# Patient Record
Sex: FEMALE | Race: BLACK OR AFRICAN AMERICAN
[De-identification: names, ages, dates, MRNs, and addresses within clinical notes are randomized per-mention and may not be internally consistent; named-entity substitution may affect disease eponyms.]

---

## 2017-06-11 ENCOUNTER — HOSPITAL ENCOUNTER (EMERGENCY)
Dept: HOSPITAL 62 - ER | Age: 47
Discharge: HOME | End: 2017-06-11
Payer: OTHER GOVERNMENT

## 2017-06-11 VITALS — SYSTOLIC BLOOD PRESSURE: 118 MMHG | DIASTOLIC BLOOD PRESSURE: 80 MMHG

## 2017-06-11 DIAGNOSIS — J03.90: Primary | ICD-10-CM

## 2017-06-11 DIAGNOSIS — R05: ICD-10-CM

## 2017-06-11 PROCEDURE — 99282 EMERGENCY DEPT VISIT SF MDM: CPT

## 2017-06-11 PROCEDURE — 96372 THER/PROPH/DIAG INJ SC/IM: CPT

## 2017-06-11 NOTE — ER DOCUMENT REPORT
HPI





- HPI


Patient complains to provider of: Sore throat


Onset: Yesterday


Onset/Duration: Gradual


Quality of pain: Achy


Pain Level: 4


Context: 


Complains of sore throat, body aches that started yesterday.  Patient does 

complain of some congestion and occasional cough.


Associated Symptoms: Sore throat


Exacerbated by: Denies


Relieved by: Denies


Similar symptoms previously: Yes


Recently seen / treated by doctor: No





- ROS


ROS below otherwise negative: Yes


Systems Reviewed and Negative: Yes All other systems reviewed and negative





- EENT


EENT: REPORTS: Sore Throat, Congestion





- CARDIOVASCULAR


Cardiovascular: DENIES: Chest pain





- RESPIRATORY


Respiratory: REPORTS: Coughing.  DENIES: Trouble Breathing





- GASTROINTESTINAL


Gastrointestinal: DENIES: Nausea, Patient vomiting





- REPRODUCTIVE


Reproductive: DENIES: Pregnant:





- DERM


Skin Color: Normal


Skin Problems: None





Past Medical History





- General


Information source: Patient





- Social History


Smoking Status: Never Smoker


Frequency of alcohol use: None


Drug Abuse: None


Occupation: mechandising


Family History: None


Patient has suicidal ideation: No


Patient has homicidal ideation: No





- Medical History


Medical History: Negative


Renal/ Medical History: Denies: Hx Peritoneal Dialysis


Past Surgical History: Reports: Hx  Section - x2





- Immunizations


Hx Diphtheria, Pertussis, Tetanus Vaccination: Yes - 





Vertical Provider Document





- CONSTITUTIONAL


Agree With Documented VS: Yes


Exam Limitations: No Limitations


General Appearance: WD/WN, No Apparent Distress





- INFECTION CONTROL


TRAVEL OUTSIDE OF THE [place] IN LAST 30 DAYS: No





- HEENT


HEENT: Atraumatic, Normocephalic, Pharyngeal Tenderness, Pharyngeal Erythema.  

negative: Pharyngeal Exudate, Tympanic Membrane Red, Tympanic Membrane Bulging





- NECK


Neck: Lymphadenopathy-Left, Lymphadenopathy-Right





- RESPIRATORY


Respiratory: Breath Sounds Normal, No Respiratory Distress, Chest Non-Tender


O2 Sat by Pulse Oximetry: 98





- CARDIOVASCULAR


Cardiovascular: Regular Rate, Regular Rhythm, No Murmur





- BACK


Back: Normal Inspection





- MUSCULOSKELETAL/EXTREMETIES


Musculoskeletal/Extremeties: MAEW





- NEURO


Level of Consciousness: Awake, Alert, Appropriate


Motor/Sensory: No Motor Deficit





- DERM


Integumentary: Warm, Dry, No Rash





Course





- Vital Signs


Vital signs: 


 











Temp Pulse Resp BP Pulse Ox


 


 98.1 F   96   18   118/80   98 


 


 17 05:50  17 05:50  17 05:50  17 05:50  17 05:50














Discharge





- Discharge


Clinical Impression: 


 Tonsillitis





Condition: Stable


Disposition: HOME, SELF-CARE


Instructions:  Tonsillitis ([facility]), Sore Throat ([facility]), Use of Over-The-Counter 

Ibuprofen ([facility]), Oral Narcotic Medication ([facility]), Antibiotic Shot ([facility])


Additional Instructions: 


Return immediately for any new or worsening symptoms





Followup with your primary care provider, call tomorrow to make a followup 

appointment








Forms:  Return to School


Referrals: 


SAVANNAH WISE MD [Primary Care Provider] - Follow up as needed

## 2017-06-15 ENCOUNTER — HOSPITAL ENCOUNTER (EMERGENCY)
Dept: HOSPITAL 62 - ER | Age: 47
Discharge: HOME | End: 2017-06-15
Payer: OTHER GOVERNMENT

## 2017-06-15 VITALS — DIASTOLIC BLOOD PRESSURE: 78 MMHG | SYSTOLIC BLOOD PRESSURE: 109 MMHG

## 2017-06-15 DIAGNOSIS — J02.9: ICD-10-CM

## 2017-06-15 DIAGNOSIS — B97.89: ICD-10-CM

## 2017-06-15 DIAGNOSIS — R05: ICD-10-CM

## 2017-06-15 DIAGNOSIS — J06.9: Primary | ICD-10-CM

## 2017-06-15 PROCEDURE — 99283 EMERGENCY DEPT VISIT LOW MDM: CPT

## 2017-06-15 NOTE — ER DOCUMENT REPORT
HPI





- HPI


Patient complains to provider of: sore throat, coughing


Onset: Other - last friday


Onset/Duration: Gradual, Persistent


Pain Level: 4


Context: 


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 

persistant sore throat sice friday. seen at [facility]  and persists wtih sore 

throat, cough, mucous wants to make sure its not anything worse. 


Associated Symptoms: None


Exacerbated by: Denies


Relieved by: Denies


Similar symptoms previously: No


Recently seen / treated by doctor: Yes





- ROS


ROS below otherwise negative: Yes


Systems Reviewed and Negative: Yes All other systems reviewed and negative





- REPRODUCTIVE


Reproductive: DENIES: Pregnant:





- DERM


Skin Color: Normal





Past Medical History





- General


Information source: Patient





- Social History


Smoking Status: Never Smoker


Frequency of alcohol use: None


Drug Abuse: None


Lives with: Family


Family History: None


Patient has suicidal ideation: No


Patient has homicidal ideation: No





- Medical History


Medical History: Negative


Renal/ Medical History: Denies: Hx Peritoneal Dialysis


Past Surgical History: Reports: Hx  Section - x2





- Immunizations


Hx Diphtheria, Pertussis, Tetanus Vaccination: Yes - 





Vertical Provider Document





- CONSTITUTIONAL


Agree With Documented VS: Yes


Exam Limitations: No Limitations





- INFECTION CONTROL


TRAVEL OUTSIDE OF THE [place] IN LAST 30 DAYS: No





- HEENT


HEENT: Normocephalic, Pharyngeal Erythema - mild.  negative: Tympanic Membrane 

Red





- NECK


Neck: Supple.  negative: Lymphadenopathy-Left, Lymphadenopathy-Right





- RESPIRATORY


Respiratory: Breath Sounds Normal, No Respiratory Distress


O2 Sat by Pulse Oximetry: 98





- CARDIOVASCULAR


Cardiovascular: Regular Rate, Regular Rhythm





- MUSCULOSKELETAL/EXTREMETIES


Musculoskeletal/Extremeties: MAEW, FROM





- NEURO


Level of Consciousness: Awake, Alert, Appropriate





- DERM


Integumentary: Warm, Dry, No Rash





Course





- Vital Signs


Vital signs: 


 











Temp Pulse Resp BP Pulse Ox


 


 98.8 F   82   16   117/78   98 


 


 06/15/17 06:39  06/15/17 06:39  06/15/17 06:39  06/15/17 06:39  06/15/17 06:39














Discharge





- Discharge


Clinical Impression: 


 Upper respiratory infection, viral





Condition: Good


Disposition: HOME, SELF-CARE


Instructions:  Sore Throat ([facility]), Upper Respiratory Illness ([facility]), Acetaminophen

, Use of Over-The-Counter Ibuprofen ([facility])


Additional Instructions: 


Rest


cool mist humidifier at night, wash daily


plenty of fluids


to  er if worse





Please complete the patient satisfaction survey if you get one, and return it.. 

If you do not receive a survey,  then you can go to the [facility] website, [email or web address] 

and place your comments about your very good care. Thank you very much. It was 

a pleasure being your medical provider today.


Forms:  Return to Work


Referrals: 


SAVANNAH WISE MD [Primary Care Provider] - Follow up as needed

## 2018-05-07 ENCOUNTER — HOSPITAL ENCOUNTER (OUTPATIENT)
Dept: HOSPITAL 62 - WI | Age: 48
End: 2018-05-07
Attending: INTERNAL MEDICINE
Payer: COMMERCIAL

## 2018-05-07 DIAGNOSIS — Z12.31: Primary | ICD-10-CM

## 2018-05-07 PROCEDURE — 77067 SCR MAMMO BI INCL CAD: CPT

## 2018-05-09 NOTE — WOMENS IMAGING REPORT
EXAM DESCRIPTION:  BILAT SCREENING MAMMO W/CAD



COMPLETED DATE/TIME:  5/7/2018 9:08 am



REASON FOR STUDY:  SCREENING MAMMO Z12.31  ENCNTR SCREEN MAMMOGRAM FOR MALIGNANT NEOPLASM OF MAXIMO



COMPARISON:  [id number]



TECHNIQUE:  Standard craniocaudal and mediolateral oblique views of each breast recorded using digita
l acquisition.



LIMITATIONS:  None.



FINDINGS:  No masses, calcifications or architectural distortion. No areas of suspicion.

Read with the assistance of CAD.

.[facility] - R2 Cenova Version 1.3

.[facility] - R2 Cenova Version 1.3

.[facility] - R2 Cenova Version 2.4

.[facility] [place] R2 Cenova Version 2.4

.[facility] - R2  Version 9.2



IMPRESSION:  NORMAL MAMMOGRAM.  BIRADS 1.



BREAST DENSITY:  c. The breasts are heterogeneously dense, which may obscure small masses.



BIRAD:  1 NEGATIVE



RECOMMENDATION:  ROUTINE SCREENING



COMMENT:  The patient has been notified of the results by letter per SA requirements. Additional no
tification policies are in place for contacting patient with suspicious or incomplete findings.

Quality ID #225: The American College of Radiology recommends an annual screening mammogram for women
 aged 40 years or over. This facility utilizes a reminder system to ensure that all patients receive 
reminder letters, and/or direct phone calls for appointments. This includes reminders for routine scr
eening mammograms, diagnostic mammograms, or other Breast Imaging Interventions when appropriate.  Th
is patient will be placed in the appropriate reminder system.

The American College of Radiology (ACR) has developed recommendations for screening MRI of the breast
s in certain patient populations, to be used in conjunction with mammography.  Breast MRI surveillanc
e may be appropriate for women with more than 20% lifetime risk of developing breast cancer  as deter
mined by genetic testing, significant family history of the disease, or history of mantle radiation f
or Hodgkins Disease.  ACR Practice Guidelines 2008.



TECHNICAL DOCUMENTATION:  FINDING NUMBER: (1)

ASSESSMENT: (1)

JOB ID:  [id number]

 2011 [employer]- All Rights Reserved



Reading location - IP/workstation name: [facility]

## 2019-06-09 ENCOUNTER — HOSPITAL ENCOUNTER (EMERGENCY)
Dept: HOSPITAL 62 - ER | Age: 49
Discharge: HOME | End: 2019-06-09
Payer: COMMERCIAL

## 2019-06-09 VITALS — SYSTOLIC BLOOD PRESSURE: 113 MMHG | DIASTOLIC BLOOD PRESSURE: 71 MMHG

## 2019-06-09 DIAGNOSIS — Z91.040: ICD-10-CM
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
## 2019-06-09 DIAGNOSIS — S86.112A: Primary | ICD-10-CM

## 2019-06-09 PROCEDURE — 99283 EMERGENCY DEPT VISIT LOW MDM: CPT

## 2019-06-09 NOTE — ER DOCUMENT REPORT
Addendum entered and electronically signed by REYES HERNANDEZ PA-C  19 

15:38: 








Discharge





- Discharge


Clinical Impression: 


Strain of calf muscle


Qualifiers:


 Encounter type: initial encounter Laterality: left Qualified Code(s): S86.812A 

- Strain of other muscle(s) and tendon(s) at lower leg level, left leg, initial 

encounter





Condition: Good


Disposition: HOME, SELF-CARE


Instructions:  Muscle Strain ([facility])


Additional Instructions: 


No direct weightbearing.  Use crutches for this.  Ice the area 20 minutes/h.  

Naproxen sodium 500 mg every 12 hours as needed for inflammation.  Norco every 6

hours as needed for moderate to severe pain.  Follow-up with orthopedics in 1 

week if not significantly better.


Prescriptions: 


Hydrocodone/Acetaminophen [Norco 5-325 mg Tablet] 1 tab PO Q6HP PRN #12 tablet


 PRN Reason: 


Naproxen 500 mg PO BID 7 Days #14 tablet


Forms:  Return to Work


Referrals: 


MARGIE ALMENDAREZ MD [ACTIVE STAFF] - Follow up in 1 week





Original Note:








ED General





- General


Chief Complaint: Leg Pain


Stated Complaint: LEG PAIN


Time Seen by Provider: 19 15:09


Primary Care Provider: 


SAVANNAH WISE MD [Primary Care Provider] - Follow up as needed


Mode of Arrival: Ambulatory


Information source: Patient


TRAVEL OUTSIDE OF THE [place] IN LAST 30 DAYS: No





- HPI


Patient complains to provider of: Left calf pain


Onset: Just prior to arrival


Onset/Duration: Sudden


Quality of pain: Sharp


Severity: Severe


Pain Level: 5


Associated symptoms: None


Exacerbated by: Movement, Walking


Relieved by: Denies


Similar symptoms previously: No


Recently seen / treated by doctor: No


Notes: 





49-year-old -American female coming in today with left calf pain behind 

the knee.  She was doing lunges at home and heard a loud pop behind her left 

knee.  Now has a lot of pain to flex her lower leg.





- Related Data


Allergies/Adverse Reactions: 


                                        





latex Allergy (Uncoded 19 14:08)


   











Past Medical History





- General


Information source: Patient





- Social History


Smoking Status: Unknown if Ever Smoked


Chew tobacco use (# tins/day): No


Frequency of alcohol use: None


Drug Abuse: None


Family History: None, Reviewed & Not Pertinent


Patient has suicidal ideation: No


Patient has homicidal ideation: No


Renal/ Medical History: Denies: Hx Peritoneal Dialysis


Past Surgical History: Reports: Hx  Section - x2





- Immunizations


Hx Diphtheria, Pertussis, Tetanus Vaccination: Yes - 





Review of Systems





- Review of Systems


Notes: 





Constitutional:  No fevers. No chills.





EENT: No eye redness. No eye pain. No ear pain. No sore throat.





Cardiovascular:  No chest pain. No palpitations.





Respiratory: No cough. No shortness of breath. No respiratory distress.





Gastrointestinal: No abdominal pain. No nausea, vomiting, or diarrhea.





Genitourinary: Atraumatic. No lesions. No pain. No discharge.





Musculoskeletal: Left calf pain





Skin: No rash or lesions.





Lymphatic: No swollen lymph nodes.





Neurologic: No headache. No syncope.





Psychiatric: No suicidal or homicidal ideation.





Physical Exam





- Vital signs


Vitals: 





                                        











Temp Pulse Resp BP Pulse Ox


 


 99 F   76   16   113/71   99 


 


 19 14:24  19 14:24  19 14:24  19 14:24  19 14:24














- Notes


Notes: 





General: Well-developed, well-nourished. In no acute distress. Non-toxic 

appearing.





Cardiac: Well-perfused. Regular rate and rhythm. No murmurs, rubs, or gallops. 





Pulmonary: No respiratory distress. No cyanosis. Bilateral lung fiels are clear 

to auscultation.





Abdominal: Non-distended. Non-rigid. Bowels sounds are present in all four 

quadrants. No guarding or rebound.





HEENT: Head is atraumatic. Conjunctivae not reddened. No tearing. PERRL. EOMI. 

Orbits atraumatic. No periorbital swelling or erythema. Oropharynx is without 

erythema, swelling, or exudates.





Neck: Supple. No adenopathy. No meningismus.





Dermatologic: Warm with good turgor. No rash. Atraumatic.





Chest: Atraumatic. No chest wall tenderness to palpation.





Musculoskeletal: Left knee and lower leg evaluated.  There is tenderness 

palpation at the calf attachment in the posterior compartment of the knee.  

There is no gross swelling.  The calf is tender to squeeze proximally.  There is

no obvious deformity through the belly of the calf.  The Achilles is nontender. 

Achilles function is maintained.  Flexion at the left knee is decreased 

secondary to pain.  Distal neurovascular exam is intact





Genitourinary: Examination deferred





Neurologic: No gross neurologic deficits.





Psychiatric: Normal mood. 











Course





- Re-evaluation


Re-evalutation: 





19 15:32


Patient obviously has a soft tissue injury to the calf at its attachment behind 

the knee.  No obvious deformity suggestive of a rupture.  Will ACE and crutch 

the patient.  Put her on Norco and naproxen.  No imaging indicated today.





- Vital Signs


Vital signs: 





                                        











Temp Pulse Resp BP Pulse Ox


 


 99 F   76   16   113/71   99 


 


 19 14:24  19 14:24  19 14:24  19 14:24  19 14:24














Discharge





- Discharge


Clinical Impression: 


Strain of calf muscle


Qualifiers:


 Encounter type: initial encounter Laterality: left Qualified Code(s): S86.812A 

- Strain of other muscle(s) and tendon(s) at lower leg level, left leg, initial 

encounter





Condition: Good


Disposition: HOME, SELF-CARE


Instructions:  Muscle Strain ([facility])


Additional Instructions: 


No direct weightbearing.  Use crutches for this.  Ice the area 20 minutes/h.  

Naproxen sodium 500 mg every 12 hours as needed for inflammation.  Norco every 6

hours as needed for moderate to severe pain.  Follow-up with orthopedics in 1 

week if not significantly better.


Prescriptions: 


Hydrocodone/Acetaminophen [Norco 5-325 mg Tablet] 1 tab PO Q6HP PRN #12 tablet


 PRN Reason: 


Naproxen 500 mg PO BID 7 Days #14 tablet


Referrals: 


MARGIE ALMENDAREZ MD [ACTIVE STAFF] - Follow up in 1 week

## 2019-07-16 ENCOUNTER — HOSPITAL ENCOUNTER (OUTPATIENT)
Dept: HOSPITAL 62 - WI | Age: 49
End: 2019-07-16
Attending: INTERNAL MEDICINE
Payer: COMMERCIAL

## 2019-07-16 DIAGNOSIS — Z12.31: Primary | ICD-10-CM

## 2019-07-16 PROCEDURE — 77067 SCR MAMMO BI INCL CAD: CPT

## 2019-07-16 NOTE — WOMENS IMAGING REPORT
EXAM DESCRIPTION:  BILAT SCREENING MAMMO W/CAD



COMPLETED DATE/TIME:  7/16/2019 2:00 pm



REASON FOR STUDY:  Z12.31 ENCOUNTER FOR SCREENING MAMMOGRAM FOR MALIGNANT NEOPLASM OF BREAST Z12.31  
ENCNTR SCREEN MAMMOGRAM FOR MALIGNANT NEOPLASM OF MAXIMO



COMPARISON:  [id number]



EXAM PARAMETERS:  Standard craniocaudal and mediolateral oblique views of each breast recorded using 
digital acquisition.

Read with the assistance of CAD.

.[facility] - Nano ePrint  Version 9.2



LIMITATIONS:  None.



FINDINGS:  No suspicious masses, suspicious calcifications or architectural distortion. No areas of c
oncern.



IMPRESSION:  Negative MAMMOGRAM.  BIRADS 1



BREAST DENSITY:  c. The breasts are heterogeneously dense, which may obscure small masses.



BIRAD:  ASSESSMENT:  1 NEGATIVE



RECOMMENDATION:  ROUTINE SCREENING



COMMENT:  The patient has been notified of the results by letter per MQSA requirements. Additional no
tification policies are in place for contacting patient with suspicious or incomplete findings.

Quality ID #225: The American College of Radiology recommends an annual screening mammogram for women
 aged 40 years or over. This facility utilizes a reminder system to ensure that all patients receive 
reminder letters, and/or direct phone calls for appointments. This includes reminders for routine scr
eening mammograms, diagnostic mammograms, or other Breast Imaging Interventions when appropriate.  Th
is patient will be placed in the appropriate reminder system.



TECHNICAL DOCUMENTATION:  FINDING NUMBER: (1)

ASSESSMENT: (1)

JOB ID:  [id number]

 2011 [employer]- All Rights Reserved



Reading location - IP/workstation name: [email or web address]

## 2020-03-03 ENCOUNTER — HOSPITAL ENCOUNTER (EMERGENCY)
Dept: HOSPITAL 62 - ER | Age: 50
Discharge: HOME | End: 2020-03-03
Payer: COMMERCIAL

## 2020-03-03 VITALS — SYSTOLIC BLOOD PRESSURE: 134 MMHG | DIASTOLIC BLOOD PRESSURE: 89 MMHG

## 2020-03-03 DIAGNOSIS — R05: ICD-10-CM

## 2020-03-03 DIAGNOSIS — J06.9: Primary | ICD-10-CM

## 2020-03-03 DIAGNOSIS — R52: ICD-10-CM

## 2020-03-03 DIAGNOSIS — R50.9: ICD-10-CM

## 2020-03-03 LAB
A TYPE INFLUENZA AG: NEGATIVE
B INFLUENZA AG: NEGATIVE

## 2020-03-03 PROCEDURE — 96372 THER/PROPH/DIAG INJ SC/IM: CPT

## 2020-03-03 PROCEDURE — 99283 EMERGENCY DEPT VISIT LOW MDM: CPT

## 2020-03-03 PROCEDURE — 87804 INFLUENZA ASSAY W/OPTIC: CPT

## 2020-03-03 PROCEDURE — S0119 ONDANSETRON 4 MG: HCPCS

## 2020-03-03 NOTE — ER DOCUMENT REPORT
HPI





- HPI


Time Seen by Provider: 20 08:17


Pain Level: 5


Notes: 





Patient is a 50-year-old female who presents to the ED complaining of dry 

nonproductive cough, fever, body ache 1 day.  Patient states that she is still 

eating and drinking without difficulties, but does have a decreased p.o. intake.

 She is still urinating normally having normal bowel movements.  Patient has 

been using some over-the-counter meds for symptoms.  She denies any significant 

past medical history including cardiopulmonary history and immunocompromised 

conditions.  Patient denies any smoking or IV drug use.  Patient requesting work

note.  Denies any current headache, neck pain, sore throat, chest pain, 

palpitations, syncope, shortness of breath, wheeze, dyspnea, abdominal pain, 

vomiting/diarrhea, urinary retention, dysuria, hematuria, or rash.





- ROS


Systems Reviewed and Negative: Yes All other systems reviewed and negative





- REPRODUCTIVE


Reproductive: DENIES: Pregnant:





Past Medical History





- Social History


Smoking Status: Never Smoker


Chew tobacco use (# tins/day): No


Frequency of alcohol use: None


Drug Abuse: None


Family History: None


Patient has suicidal ideation: No


Patient has homicidal ideation: No


Renal/ Medical History: Denies: Hx Peritoneal Dialysis


Past Surgical History: Reports: Hx  Section - x2





- Immunizations


Hx Diphtheria, Pertussis, Tetanus Vaccination: Yes - 





Vertical Provider Document





- CONSTITUTIONAL


Agree With Documented VS: Yes


Notes: 





PHYSICAL EXAMINATION:





GENERAL: Well-appearing, well-nourished and in no acute distress.  A&Ox4.  

Answers questions appropriately.  Moves comfortably w/o notable distress





HEAD: Atraumatic, normocephalic.





EYES: Pupils equal round and reactive to light, extraocular movements intact, 

sclera anicteric, conjunctiva are normal.





ENT:  Nares patent and with clear discharge.  oropharynx no erythema without 

exudates.  No tonsilar hypertrophy without erythema or exudate.  No palatine 

shift.  Uvula midline.  No tongue protrusion.  No drooling, hoarseness, or 

airway compromise.  Moist mucous membranes.  No sinus tenderness.





NECK: Normal range of motion, supple without lymphadenopathy.  No 

rigidity/meningismus.





LUNGS: Breath sounds clear to auscultation bilaterally and equal.  No wheezes 

rales or rhonchi.  No retractions





HEART: Regular rate and rhythm without murmurs, rubs, gallops.





ABDOMEN: Soft, nontender, nondistended abdomen.  No guarding, no rebound. Normal

bowel sounds present.  No CVA tenderness bilaterally.  





NEUROLOGICAL: Normal speech, normal gait.  





PSYCH: Normal mood, normal affect.





SKIN: Warm, Dry, normal turgor, no rashes or lesions noted.





- INFECTION CONTROL


TRAVEL OUTSIDE OF THE [place] IN LAST 30 DAYS: No





Course





- Re-evaluation


Re-evalutation: 





20 08:40


Patient is an afebrile, well-hydrated, 50-year-old female who presents to the ED

with acute URI, suspect viral/influenza.  Vitals are acceptable.  PE is 

otherwise unremarkable.  No fiurther labs or imaging warranted at this time 

based on H&P.  Patient has no significant cardiopulmonary or immunocompromised 

medical conditions.  Patient's lungs are clear to auscultation bilaterally 

without tachycardia, hypoxia, or tachypnea.  Patient is tolerating p.o. without 

any difficulties.  Thoroughly reviewed the risks, benefits, potential side 

effects, estimated cost without insurance with patient.  After thorough review, 

patient declined Tamiflu at this time.  Low suspicion for any meningitis, 

sepsis, peritonsillar/pharyngeal abscess, respiratory compromise, severe 

dehydration, or other emergent systemic condition at this time.  Patient is 

aware this condition can change from initial presentation and she needs to 

monitor symptoms closely.  Conservative measures otherwise for symptoms.  

Recheck with your PCM in 3-5 days.  Return to the ED with any 

worsening/concerning symptoms otherwise as reviewed in discharge.  Patient is in

agreement.





- Vital Signs


Vital signs: 


                                        











Temp Pulse Resp BP Pulse Ox


 


 99.2 F   94   16   145/90 H  98 


 


 20 07:35  20 07:35  20 07:35  20 07:35  20 07:35














Discharge





- Discharge


Clinical Impression: 


 Acute URI





Condition: Stable


Disposition: HOME, SELF-CARE


Instructions:  Upper Respiratory Illness ([facility])


Additional Instructions: 


Maintain adequate fluid intake


tylenol/ibuprofen as needed alternating every 3 hours for fever/body ache


over the counter cold medication as needed for symptoms


Humidified air may help


Wash your hands regularly


Wear a mask when coughing


F/u:  with your PCM in 3-5 days for a recheck





Return to the ED with any fever, altered mental status/behavior, chest pain, 

palpitations, syncope, headache, neck pain/stiffness, shortness of breath, chest

pains, wheezing, drooling, trouble swallowing/breathing, abdominal pain, n/v/d, 

rash, or worsening/concerning symptoms otherwise.


Prescriptions: 


Ibuprofen [Motrin 800 mg Tablet] 800 mg PO Q8H PRN #15 tab


 PRN Reason: 


Ondansetron [Zofran Odt 4 mg Tablet] 1 - 2 tab PO Q4H PRN #15 tab.rapdis


 PRN Reason: For Nausea/Vomiting


Forms:  Elevated Blood Pressure, Return to Work


Referrals: 


GUTIERREZ DAHL MD [Primary Care Provider] - Follow up in 3-5 days